# Patient Record
Sex: FEMALE | Race: WHITE | NOT HISPANIC OR LATINO | Employment: OTHER | URBAN - METROPOLITAN AREA
[De-identification: names, ages, dates, MRNs, and addresses within clinical notes are randomized per-mention and may not be internally consistent; named-entity substitution may affect disease eponyms.]

---

## 2017-03-08 ENCOUNTER — OFFICE VISIT (OUTPATIENT)
Dept: SURGERY | Facility: CLINIC | Age: 82
End: 2017-03-08
Payer: MEDICARE

## 2017-03-08 VITALS
WEIGHT: 157.19 LBS | HEART RATE: 98 BPM | TEMPERATURE: 98 F | DIASTOLIC BLOOD PRESSURE: 73 MMHG | SYSTOLIC BLOOD PRESSURE: 129 MMHG

## 2017-03-08 DIAGNOSIS — K64.0 FIRST DEGREE HEMORRHOIDS: Primary | ICD-10-CM

## 2017-03-08 PROCEDURE — 99999 PR PBB SHADOW E&M-NEW PATIENT-LVL III: CPT | Mod: PBBFAC,,, | Performed by: SURGERY

## 2017-03-08 PROCEDURE — 99203 OFFICE O/P NEW LOW 30 MIN: CPT | Mod: PBBFAC,PO | Performed by: SURGERY

## 2017-03-08 PROCEDURE — 99203 OFFICE O/P NEW LOW 30 MIN: CPT | Mod: S$PBB,,, | Performed by: SURGERY

## 2017-03-08 RX ORDER — ASPIRIN 81 MG/1
325 TABLET ORAL DAILY
COMMUNITY
End: 2021-03-01

## 2017-03-08 RX ORDER — LISINOPRIL 20 MG/1
20 TABLET ORAL DAILY
COMMUNITY
End: 2021-03-01

## 2017-03-08 RX ORDER — SIMVASTATIN 40 MG/1
TABLET, FILM COATED ORAL
Refills: 3 | COMMUNITY
Start: 2017-01-11 | End: 2021-03-01

## 2017-03-08 RX ORDER — FOLIC ACID 0.4 MG
400 TABLET ORAL DAILY
COMMUNITY

## 2017-03-08 RX ORDER — HYDROCORTISONE 2.5% 25 MG/G
CREAM TOPICAL
Refills: 1 | COMMUNITY
Start: 2017-02-22 | End: 2021-03-01

## 2017-03-08 RX ORDER — HUMAN INSULIN 100 [IU]/ML
INJECTION, SUSPENSION SUBCUTANEOUS
Refills: 3 | COMMUNITY
Start: 2017-01-11 | End: 2021-03-01

## 2017-03-08 RX ORDER — CHOLECALCIFEROL (VITAMIN D3) 25 MCG
185 TABLET ORAL DAILY
COMMUNITY
End: 2021-03-01

## 2017-03-08 RX ORDER — CYANOCOBALAMIN 1000 UG/ML
INJECTION, SOLUTION INTRAMUSCULAR; SUBCUTANEOUS
Refills: 3 | COMMUNITY
Start: 2016-12-14 | End: 2020-12-21

## 2017-03-08 RX ORDER — BUPROPION HYDROCHLORIDE 100 MG/1
TABLET, EXTENDED RELEASE ORAL
Refills: 3 | COMMUNITY
Start: 2017-02-03 | End: 2021-03-01

## 2017-03-08 RX ORDER — ALENDRONATE SODIUM 70 MG/1
TABLET ORAL
Refills: 11 | COMMUNITY
Start: 2017-01-11 | End: 2021-05-03

## 2017-03-08 RX ORDER — METFORMIN HYDROCHLORIDE 500 MG/1
TABLET, EXTENDED RELEASE ORAL
Refills: 1 | COMMUNITY
Start: 2016-12-14 | End: 2021-03-01

## 2017-03-08 NOTE — LETTER
March 8, 2017        Self Referral             [place] [facility] – [place] - General Surgery  [address]  [place] [address]  Phone: [phone]   Patient: Galilea Fountain   MR Number: [id number]   YOB: 1928   Date of Visit: 3/8/2017       Dear  Referral:    Thank you for referring Galilea Fountain to me for evaluation. Attached you will find relevant portions of my assessment and plan of care.    If you have questions, please do not hesitate to call me. I look forward to following Galilea Fountain along with you.    Sincerely,      Reagan Ariza MD            CC  Jace Baca MD    Enclosure

## 2017-03-08 NOTE — PROGRESS NOTES
Subjective:       Patient ID: Galilea Fountain is a 88 y.o. female.    Chief Complaint: Consult (hemorr)    HPI 88-year-old female referred by Dr. Li for evaluation of hemorrhoids.  The patient says she complained of some swelling and some slight anal discomfort most notably since a episode of straining from a constipated bowel movement about 3 weeks ago.  She has never had significant problems with hemorrhoids before.  She does not appear to have any rectal prolapse.  There's been no bleeding.  She is currently having normal bowel movements.  She denies any anal pain currently other than some mild pressure at times.  She is tolerating a regular diet.    Review of Systems   Constitutional: Negative for activity change, chills, fever and unexpected weight change.   HENT: Negative for congestion, hearing loss, sore throat and voice change.    Eyes: Negative.    Respiratory: Negative for cough, shortness of breath and wheezing.    Cardiovascular: Negative for chest pain and palpitations.   Gastrointestinal: Positive for constipation and rectal pain. Negative for abdominal pain, blood in stool, nausea and vomiting.   Genitourinary: Negative for dysuria, frequency and hematuria.   Musculoskeletal: Negative for arthralgias, back pain and neck pain.   Skin: Negative for color change and wound.   Allergic/Immunologic: Negative.    Neurological: Negative for dizziness, weakness and headaches.   Hematological: Negative for adenopathy. Does not bruise/bleed easily.   Psychiatric/Behavioral: Negative for confusion and dysphoric mood. The patient is not nervous/anxious.      Objective:     Physical Exam   Constitutional: She is oriented to person, place, and time. She appears well-developed and well-nourished. No distress.   HENT:   Head: Normocephalic and atraumatic.   Eyes: Conjunctivae and EOM are normal. Pupils are equal, round, and reactive to light. No scleral icterus.   Neck: Normal range of motion. Neck supple. No  thyromegaly present.   Cardiovascular: Normal rate, regular rhythm, normal heart sounds and intact distal pulses.    No murmur heard.  Pulmonary/Chest: No stridor. No respiratory distress. She has no wheezes. She has no rales.   Abdominal: Soft. Bowel sounds are normal. She exhibits no distension and no mass. There is no tenderness.   Genitourinary:   Genitourinary Comments: Patient has some mild external and internal hemorrhoids.  There is no bleeding.  There is no anal tenderness.  She has essentially normal tone.  There is no fissure.  There is no evidence of prolapse.   Musculoskeletal: Normal range of motion. She exhibits no edema.   Lymphadenopathy:     She has no cervical adenopathy.   Neurological: She is alert and oriented to person, place, and time. No cranial nerve deficit. She exhibits normal muscle tone.   Skin: Skin is warm and dry. No rash noted. No erythema.   Psychiatric: She has a normal mood and affect. Her behavior is normal. Judgment normal.     Assessment:     Encounter Diagnosis   Name Primary?    First degree hemorrhoids Yes       Plan:      1.  Discussed continued efforts to remain regular.  2.  I do not think she needs any surgical intervention for her hemorrhoids at this time.  3.  She will follow-up with me as needed.

## 2020-10-29 ENCOUNTER — OFFICE VISIT (OUTPATIENT)
Dept: FAMILY MEDICINE | Facility: CLINIC | Age: 85
End: 2020-10-29
Payer: MEDICARE

## 2020-10-29 VITALS
WEIGHT: 160 LBS | DIASTOLIC BLOOD PRESSURE: 70 MMHG | BODY MASS INDEX: 30.21 KG/M2 | SYSTOLIC BLOOD PRESSURE: 126 MMHG | HEART RATE: 80 BPM | HEIGHT: 61 IN | OXYGEN SATURATION: 96 % | TEMPERATURE: 98 F | RESPIRATION RATE: 16 BRPM

## 2020-10-29 DIAGNOSIS — E11.65 UNCONTROLLED TYPE 2 DIABETES MELLITUS WITH HYPERGLYCEMIA: Primary | ICD-10-CM

## 2020-10-29 PROCEDURE — 99213 PR OFFICE/OUTPT VISIT, EST, LEVL III, 20-29 MIN: ICD-10-PCS | Mod: S$GLB,,, | Performed by: INTERNAL MEDICINE

## 2020-10-29 PROCEDURE — 99213 OFFICE O/P EST LOW 20 MIN: CPT | Mod: S$GLB,,, | Performed by: INTERNAL MEDICINE

## 2020-10-29 PROCEDURE — 90694 FLU VACCINE - QUADRIVALENT - ADJUVANTED: ICD-10-PCS | Mod: S$GLB,,, | Performed by: INTERNAL MEDICINE

## 2020-10-29 PROCEDURE — 90694 VACC AIIV4 NO PRSRV 0.5ML IM: CPT | Mod: S$GLB,,, | Performed by: INTERNAL MEDICINE

## 2020-10-29 PROCEDURE — G0008 ADMIN INFLUENZA VIRUS VAC: HCPCS | Mod: S$GLB,,, | Performed by: INTERNAL MEDICINE

## 2020-10-29 PROCEDURE — G0008 FLU VACCINE - QUADRIVALENT - ADJUVANTED: ICD-10-PCS | Mod: S$GLB,,, | Performed by: INTERNAL MEDICINE

## 2020-10-29 RX ORDER — CLOPIDOGREL BISULFATE 75 MG/1
75 TABLET ORAL
COMMUNITY
Start: 2022-02-28 | End: 2022-09-29 | Stop reason: SDUPTHER

## 2020-10-29 RX ORDER — INSULIN DEGLUDEC 100 U/ML
16 INJECTION, SOLUTION SUBCUTANEOUS
COMMUNITY
End: 2021-03-01

## 2020-10-29 RX ORDER — ATORVASTATIN CALCIUM 80 MG/1
80 TABLET, FILM COATED ORAL
COMMUNITY
Start: 2022-02-28 | End: 2022-09-29 | Stop reason: SDUPTHER

## 2020-10-29 RX ORDER — INSULIN LISPRO 100 [IU]/ML
4 INJECTION, SOLUTION INTRAVENOUS; SUBCUTANEOUS
COMMUNITY

## 2020-10-29 RX ORDER — METOPROLOL TARTRATE 25 MG/1
25 TABLET, FILM COATED ORAL 2 TIMES DAILY
COMMUNITY
Start: 2022-02-28 | End: 2022-09-29 | Stop reason: SDUPTHER

## 2020-10-29 RX ORDER — ISOSORBIDE MONONITRATE 30 MG/1
30 TABLET, EXTENDED RELEASE ORAL
COMMUNITY
Start: 2020-09-28 | End: 2022-09-29 | Stop reason: SDUPTHER

## 2020-10-29 RX ORDER — DOCUSATE SODIUM 100 MG/1
100 CAPSULE, LIQUID FILLED ORAL
COMMUNITY

## 2020-10-29 NOTE — PATIENT INSTRUCTIONS
Understanding Carbohydrates, Fats, and Protein  Food is a source of fuel and nourishment for your body. Its also a source of pleasure. Having diabetes doesnt mean you have to eat special foods or give up desserts. Instead, your dietitian can show you how to plan meals to suit your body. To start, learn how different foods affect blood sugar.  Carbohydrates  Carbohydrates are the main source of fuel for the body. Carbohydrates raise blood sugar. Many people think carbohydrates are only found in pasta or bread. But carbohydrates are actually in many kinds of foods:  · Sugars occur naturally in foods such as fruit, milk, honey, and molasses. Sugars can also be added to many foods, from cereals and yogurt to candy and desserts. Sugars raise blood sugar.  · Starches are found in bread, cereals, pasta, and dried beans. Theyre also found in corn, peas, potatoes, yam, acorn squash, and butternut squash. Starches also raise blood sugar.   · Fiber is found in foods such as vegetables, fruits, beans, and whole grains. Unlike other carbs, fiber isnt digested or absorbed. So it doesnt raise blood sugar. In fact, fiber can help keep blood sugar from rising too fast. It also helps keep blood cholesterol at a healthy level.  Did you know?  Even though carbohydrates raise blood sugar, its best to have some in every meal. They are an important part of a healthy diet.   Fat  Fat is an energy source that can be stored until needed. Fat does not raise blood sugar. However, it can raise blood cholesterol, increasing the risk of heart disease. Fat is also high in calories, which can cause weight gain. Not all types of fat are the same.  More Healthy:  · Monounsaturated fats are mostly found in vegetable oils, such as olive, canola, and peanut oils. They are also found in avocados and some nuts. Monounsaturated fats are healthy for your heart. Thats because they lower LDL (unhealthy) cholesterol.  · Polyunsaturated fats are mostly  found in vegetable oils, such as corn, safflower, and soybean oils. They are also found in some seeds, nuts, and fish. Polyunsaturated fats lower LDL (unhealthy) cholesterol. So, choosing them instead of saturated fats is healthy for your heart. Certain unsaturated fats can help lower triglycerides.   Less Healthy:  · Saturated fats are found in animal products, such as meat, poultry, whole milk, lard, and butter. Saturated fats raise LDL cholesterol and are not healthy for your heart.  · Hydrogenated oils and trans fats are formed when vegetable oils are processed into solid fats. They are found in many processed foods. Hydrogenated oils and trans fats raise LDL cholesterol and lower HDL (healthy) cholesterol. They are not healthy for your heart.  Protein  Protein helps the body build and repair muscle and other tissue. Protein has little or no effect on blood sugar. However, many foods that contain protein also contain saturated fat. By choosing low-fat protein sources, you can get the benefits of protein without the extra fat:  · Plant protein is found in dry beans and peas, nuts, and soy products, such as tofu and soymilk. These sources tend to be cholesterol-free and low in saturated fat.  · Animal protein is found in fish, poultry, meat, cheese, milk, and eggs. These contain cholesterol and can be high in saturated fat. Aim for lean, lower-fat choices.  Date Last Reviewed: 3/1/2016  © 4904-2389 [employer]. [address] [place] [address], [place] [address]. All rights reserved. This information is not intended as a substitute for professional medical care. Always follow your healthcare professional's instructions.

## 2020-10-29 NOTE — PROGRESS NOTES
Subjective:       Patient ID: Galilea Fountain is a 89 y.o. female.    Chief Complaint: Follow-up (Lab work)    Pt here for routine DM follow up  Hgb A1c now 8.6 on 10/27/2020. By her accuchek record, her FBS are < 125 and 2 hrs after meal are < 170. These numbers do not correlate with the HgbA1c      Follow-up  This is a chronic problem.     Review of Systems      Objective:      Physical Exam    Assessment:       No diagnosis found.    Plan:

## 2020-10-29 NOTE — PROGRESS NOTES
Subjective:       Patient ID: Galilea Fountain is a 89 y.o. female.    Chief Complaint: Follow-up (Lab work)    HPI  Review of Systems      Objective:      Physical Exam    Assessment:       No diagnosis found.    Plan:

## 2021-02-24 ENCOUNTER — APPOINTMENT (OUTPATIENT)
Dept: LAB | Facility: HOSPITAL | Age: 86
End: 2021-02-24
Attending: INTERNAL MEDICINE
Payer: MEDICARE

## 2021-03-01 ENCOUNTER — OFFICE VISIT (OUTPATIENT)
Dept: FAMILY MEDICINE | Facility: CLINIC | Age: 86
End: 2021-03-01
Payer: MEDICARE

## 2021-03-01 VITALS
BODY MASS INDEX: 30.82 KG/M2 | SYSTOLIC BLOOD PRESSURE: 122 MMHG | HEART RATE: 66 BPM | HEIGHT: 60 IN | DIASTOLIC BLOOD PRESSURE: 62 MMHG | TEMPERATURE: 97 F | WEIGHT: 157 LBS | OXYGEN SATURATION: 96 % | RESPIRATION RATE: 16 BRPM

## 2021-03-01 DIAGNOSIS — D64.9 ANEMIA, UNSPECIFIED TYPE: ICD-10-CM

## 2021-03-01 DIAGNOSIS — E03.9 HYPOTHYROIDISM, UNSPECIFIED TYPE: ICD-10-CM

## 2021-03-01 DIAGNOSIS — E78.00 HYPERCHOLESTEROLEMIA: ICD-10-CM

## 2021-03-01 DIAGNOSIS — M81.0 AGE RELATED OSTEOPOROSIS, UNSPECIFIED PATHOLOGICAL FRACTURE PRESENCE: ICD-10-CM

## 2021-03-01 DIAGNOSIS — Z79.899 ENCOUNTER FOR LONG-TERM (CURRENT) USE OF OTHER MEDICATIONS: ICD-10-CM

## 2021-03-01 DIAGNOSIS — E53.8 B12 DEFICIENCY: ICD-10-CM

## 2021-03-01 DIAGNOSIS — E11.9 DIABETES MELLITUS WITHOUT COMPLICATION: Primary | ICD-10-CM

## 2021-03-01 DIAGNOSIS — G62.9 NEUROPATHY: ICD-10-CM

## 2021-03-01 PROCEDURE — 99213 OFFICE O/P EST LOW 20 MIN: CPT | Mod: S$GLB,,, | Performed by: INTERNAL MEDICINE

## 2021-03-01 PROCEDURE — 99213 PR OFFICE/OUTPT VISIT, EST, LEVL III, 20-29 MIN: ICD-10-PCS | Mod: S$GLB,,, | Performed by: INTERNAL MEDICINE

## 2021-03-01 RX ORDER — ERGOCALCIFEROL 1.25 MG/1
50000 CAPSULE ORAL
COMMUNITY

## 2021-03-01 RX ORDER — LANCETS
EACH MISCELLANEOUS
COMMUNITY
Start: 2020-12-31 | End: 2021-07-06

## 2021-03-01 RX ORDER — ASPIRIN 325 MG
325 TABLET ORAL DAILY
COMMUNITY

## 2021-05-04 ENCOUNTER — OFFICE VISIT (OUTPATIENT)
Dept: FAMILY MEDICINE | Facility: CLINIC | Age: 86
End: 2021-05-04
Payer: MEDICARE

## 2021-05-04 VITALS
DIASTOLIC BLOOD PRESSURE: 62 MMHG | OXYGEN SATURATION: 94 % | WEIGHT: 159 LBS | SYSTOLIC BLOOD PRESSURE: 112 MMHG | BODY MASS INDEX: 31.22 KG/M2 | HEART RATE: 69 BPM | HEIGHT: 60 IN | TEMPERATURE: 98 F | RESPIRATION RATE: 16 BRPM

## 2021-05-04 DIAGNOSIS — M81.0 AGE RELATED OSTEOPOROSIS, UNSPECIFIED PATHOLOGICAL FRACTURE PRESENCE: ICD-10-CM

## 2021-05-04 DIAGNOSIS — E78.00 HYPERCHOLESTEROLEMIA: ICD-10-CM

## 2021-05-04 DIAGNOSIS — G62.9 NEUROPATHY: ICD-10-CM

## 2021-05-04 DIAGNOSIS — I25.10 CORONARY ARTERY DISEASE INVOLVING NATIVE CORONARY ARTERY OF NATIVE HEART WITHOUT ANGINA PECTORIS: ICD-10-CM

## 2021-05-04 DIAGNOSIS — E53.8 B12 DEFICIENCY: ICD-10-CM

## 2021-05-04 DIAGNOSIS — G45.9 TIA (TRANSIENT ISCHEMIC ATTACK): Primary | ICD-10-CM

## 2021-05-04 DIAGNOSIS — E11.9 DIABETES MELLITUS WITHOUT COMPLICATION: ICD-10-CM

## 2021-05-04 DIAGNOSIS — I10 ESSENTIAL HYPERTENSION: ICD-10-CM

## 2021-05-04 DIAGNOSIS — Z98.61 HISTORY OF PTCA: ICD-10-CM

## 2021-05-04 PROCEDURE — 99213 PR OFFICE/OUTPT VISIT, EST, LEVL III, 20-29 MIN: ICD-10-PCS | Mod: S$GLB,,, | Performed by: INTERNAL MEDICINE

## 2021-05-04 PROCEDURE — 99213 OFFICE O/P EST LOW 20 MIN: CPT | Mod: S$GLB,,, | Performed by: INTERNAL MEDICINE

## 2021-06-01 ENCOUNTER — LAB VISIT (OUTPATIENT)
Dept: FAMILY MEDICINE | Facility: CLINIC | Age: 86
End: 2021-06-01
Payer: MEDICARE

## 2021-06-01 DIAGNOSIS — Z79.899 ENCOUNTER FOR LONG-TERM (CURRENT) USE OF OTHER MEDICATIONS: ICD-10-CM

## 2021-06-01 DIAGNOSIS — E53.8 B12 DEFICIENCY: ICD-10-CM

## 2021-06-01 DIAGNOSIS — E11.9 DIABETES MELLITUS WITHOUT COMPLICATION: ICD-10-CM

## 2021-06-01 DIAGNOSIS — E03.9 HYPOTHYROIDISM, UNSPECIFIED TYPE: ICD-10-CM

## 2021-06-01 DIAGNOSIS — E78.00 HYPERCHOLESTEROLEMIA: ICD-10-CM

## 2021-06-01 LAB
ALBUMIN SERPL BCP-MCNC: 3.4 G/DL (ref 3.5–5.2)
ALP SERPL-CCNC: 66 U/L (ref 55–135)
ALT SERPL W/O P-5'-P-CCNC: 13 U/L (ref 10–44)
ANION GAP SERPL CALC-SCNC: 10 MMOL/L (ref 8–16)
AST SERPL-CCNC: 14 U/L (ref 10–40)
BASOPHILS # BLD AUTO: 0.04 K/UL (ref 0–0.2)
BASOPHILS NFR BLD: 0.7 % (ref 0–1.9)
BILIRUB SERPL-MCNC: 0.4 MG/DL (ref 0.1–1)
BUN SERPL-MCNC: 31 MG/DL (ref 10–30)
CALCIUM SERPL-MCNC: 8.6 MG/DL (ref 8.7–10.5)
CHLORIDE SERPL-SCNC: 111 MMOL/L (ref 95–110)
CHOLEST SERPL-MCNC: 119 MG/DL (ref 120–199)
CHOLEST/HDLC SERPL: 2.9 {RATIO} (ref 2–5)
CO2 SERPL-SCNC: 18 MMOL/L (ref 23–29)
CREAT SERPL-MCNC: 1.6 MG/DL (ref 0.5–1.4)
DIFFERENTIAL METHOD: ABNORMAL
EOSINOPHIL # BLD AUTO: 0.1 K/UL (ref 0–0.5)
EOSINOPHIL NFR BLD: 1.9 % (ref 0–8)
ERYTHROCYTE [DISTWIDTH] IN BLOOD BY AUTOMATED COUNT: 13.6 % (ref 11.5–14.5)
EST. GFR  (AFRICAN AMERICAN): 32 ML/MIN/1.73 M^2
EST. GFR  (NON AFRICAN AMERICAN): 28 ML/MIN/1.73 M^2
GLUCOSE SERPL-MCNC: 306 MG/DL (ref 70–110)
HCT VFR BLD AUTO: 38.2 % (ref 37–48.5)
HDLC SERPL-MCNC: 41 MG/DL (ref 40–75)
HDLC SERPL: 34.5 % (ref 20–50)
HGB BLD-MCNC: 11.5 G/DL (ref 12–16)
IMM GRANULOCYTES # BLD AUTO: 0.02 K/UL (ref 0–0.04)
IMM GRANULOCYTES NFR BLD AUTO: 0.3 % (ref 0–0.5)
LDLC SERPL CALC-MCNC: 49.8 MG/DL (ref 63–159)
LYMPHOCYTES # BLD AUTO: 1.3 K/UL (ref 1–4.8)
LYMPHOCYTES NFR BLD: 21.7 % (ref 18–48)
MCH RBC QN AUTO: 30.7 PG (ref 27–31)
MCHC RBC AUTO-ENTMCNC: 30.1 G/DL (ref 32–36)
MCV RBC AUTO: 102 FL (ref 82–98)
MONOCYTES # BLD AUTO: 0.5 K/UL (ref 0.3–1)
MONOCYTES NFR BLD: 9.3 % (ref 4–15)
NEUTROPHILS # BLD AUTO: 3.8 K/UL (ref 1.8–7.7)
NEUTROPHILS NFR BLD: 66.1 % (ref 38–73)
NONHDLC SERPL-MCNC: 78 MG/DL
NRBC BLD-RTO: 0 /100 WBC
PLATELET # BLD AUTO: 194 K/UL (ref 150–450)
PMV BLD AUTO: 11.1 FL (ref 9.2–12.9)
POTASSIUM SERPL-SCNC: 4.7 MMOL/L (ref 3.5–5.1)
PROT SERPL-MCNC: 6.3 G/DL (ref 6–8.4)
RBC # BLD AUTO: 3.74 M/UL (ref 4–5.4)
SODIUM SERPL-SCNC: 139 MMOL/L (ref 136–145)
T4 FREE SERPL-MCNC: 0.77 NG/DL (ref 0.71–1.51)
TRIGL SERPL-MCNC: 141 MG/DL (ref 30–150)
TSH SERPL DL<=0.005 MIU/L-ACNC: 1.88 UIU/ML (ref 0.4–4)
WBC # BLD AUTO: 5.8 K/UL (ref 3.9–12.7)

## 2021-06-01 PROCEDURE — 80061 LIPID PANEL: CPT | Performed by: INTERNAL MEDICINE

## 2021-06-01 PROCEDURE — 85025 COMPLETE CBC W/AUTO DIFF WBC: CPT | Performed by: INTERNAL MEDICINE

## 2021-06-01 PROCEDURE — 80053 COMPREHEN METABOLIC PANEL: CPT | Performed by: INTERNAL MEDICINE

## 2021-06-01 PROCEDURE — 83036 HEMOGLOBIN GLYCOSYLATED A1C: CPT | Performed by: INTERNAL MEDICINE

## 2021-06-01 PROCEDURE — 84443 ASSAY THYROID STIM HORMONE: CPT | Performed by: INTERNAL MEDICINE

## 2021-06-01 PROCEDURE — 84439 ASSAY OF FREE THYROXINE: CPT | Performed by: INTERNAL MEDICINE

## 2021-06-02 LAB
ESTIMATED AVG GLUCOSE: 206 MG/DL (ref 68–131)
HBA1C MFR BLD: 8.8 % (ref 4–5.6)

## 2021-06-03 ENCOUNTER — OFFICE VISIT (OUTPATIENT)
Dept: FAMILY MEDICINE | Facility: CLINIC | Age: 86
End: 2021-06-03
Payer: MEDICARE

## 2021-06-03 VITALS
BODY MASS INDEX: 31.22 KG/M2 | TEMPERATURE: 98 F | HEIGHT: 60 IN | HEART RATE: 81 BPM | SYSTOLIC BLOOD PRESSURE: 136 MMHG | DIASTOLIC BLOOD PRESSURE: 78 MMHG | RESPIRATION RATE: 16 BRPM | OXYGEN SATURATION: 96 % | WEIGHT: 159 LBS

## 2021-06-03 DIAGNOSIS — Z98.61 HISTORY OF PTCA: ICD-10-CM

## 2021-06-03 DIAGNOSIS — B35.1 ONYCHOMYCOSIS OF MULTIPLE TOENAILS WITH TYPE 2 DIABETES MELLITUS: ICD-10-CM

## 2021-06-03 DIAGNOSIS — E78.00 HYPERCHOLESTEROLEMIA: ICD-10-CM

## 2021-06-03 DIAGNOSIS — E11.69 ONYCHOMYCOSIS OF MULTIPLE TOENAILS WITH TYPE 2 DIABETES MELLITUS: ICD-10-CM

## 2021-06-03 DIAGNOSIS — I10 ESSENTIAL HYPERTENSION: ICD-10-CM

## 2021-06-03 DIAGNOSIS — G45.9 TIA (TRANSIENT ISCHEMIC ATTACK): ICD-10-CM

## 2021-06-03 DIAGNOSIS — M85.852 OSTEOPENIA OF LEFT HIP: ICD-10-CM

## 2021-06-03 DIAGNOSIS — G62.9 NEUROPATHY: ICD-10-CM

## 2021-06-03 DIAGNOSIS — E11.9 DIABETES MELLITUS WITHOUT COMPLICATION: Primary | ICD-10-CM

## 2021-06-03 PROCEDURE — 99397 PR PREVENTIVE VISIT,EST,65 & OVER: ICD-10-PCS | Mod: S$GLB,,, | Performed by: INTERNAL MEDICINE

## 2021-06-03 PROCEDURE — 99397 PER PM REEVAL EST PAT 65+ YR: CPT | Mod: S$GLB,,, | Performed by: INTERNAL MEDICINE

## 2021-09-21 ENCOUNTER — LAB VISIT (OUTPATIENT)
Dept: FAMILY MEDICINE | Facility: CLINIC | Age: 86
End: 2021-09-21
Payer: MEDICARE

## 2021-09-21 DIAGNOSIS — E11.9 DIABETES MELLITUS WITHOUT COMPLICATION: ICD-10-CM

## 2021-09-21 LAB
ESTIMATED AVG GLUCOSE: 177 MG/DL (ref 68–131)
HBA1C MFR BLD: 7.8 % (ref 4–5.6)

## 2021-09-21 PROCEDURE — 83036 HEMOGLOBIN GLYCOSYLATED A1C: CPT | Performed by: INTERNAL MEDICINE

## 2021-09-21 PROCEDURE — 82570 ASSAY OF URINE CREATININE: CPT | Performed by: INTERNAL MEDICINE

## 2021-09-22 LAB
ALBUMIN/CREAT UR: 26.7 UG/MG (ref 0–30)
CREAT UR-MCNC: 150 MG/DL (ref 15–325)
MICROALBUMIN UR DL<=1MG/L-MCNC: 40 UG/ML

## 2021-09-23 ENCOUNTER — OFFICE VISIT (OUTPATIENT)
Dept: FAMILY MEDICINE | Facility: CLINIC | Age: 86
End: 2021-09-23
Payer: MEDICARE

## 2021-09-23 VITALS
DIASTOLIC BLOOD PRESSURE: 68 MMHG | SYSTOLIC BLOOD PRESSURE: 132 MMHG | BODY MASS INDEX: 32.2 KG/M2 | HEART RATE: 70 BPM | RESPIRATION RATE: 16 BRPM | OXYGEN SATURATION: 95 % | TEMPERATURE: 98 F | WEIGHT: 164 LBS | HEIGHT: 60 IN

## 2021-09-23 DIAGNOSIS — G62.9 NEUROPATHY: ICD-10-CM

## 2021-09-23 DIAGNOSIS — M85.852 OSTEOPENIA OF LEFT HIP: ICD-10-CM

## 2021-09-23 DIAGNOSIS — E11.9 DIABETES MELLITUS WITHOUT COMPLICATION: Primary | ICD-10-CM

## 2021-09-23 DIAGNOSIS — G45.9 TIA (TRANSIENT ISCHEMIC ATTACK): ICD-10-CM

## 2021-09-23 PROCEDURE — 99212 OFFICE O/P EST SF 10 MIN: CPT | Mod: S$GLB,,, | Performed by: INTERNAL MEDICINE

## 2021-09-23 PROCEDURE — 99212 PR OFFICE/OUTPT VISIT, EST, LEVL II, 10-19 MIN: ICD-10-PCS | Mod: S$GLB,,, | Performed by: INTERNAL MEDICINE

## 2021-10-11 ENCOUNTER — CLINICAL SUPPORT (OUTPATIENT)
Dept: FAMILY MEDICINE | Facility: CLINIC | Age: 86
End: 2021-10-11
Payer: MEDICARE

## 2021-10-11 ENCOUNTER — TELEPHONE (OUTPATIENT)
Dept: FAMILY MEDICINE | Facility: CLINIC | Age: 86
End: 2021-10-11

## 2021-10-11 PROCEDURE — G0008 FLU VACCINE - QUADRIVALENT - ADJUVANTED: ICD-10-PCS | Mod: S$GLB,,, | Performed by: INTERNAL MEDICINE

## 2021-10-11 PROCEDURE — 90694 FLU VACCINE - QUADRIVALENT - ADJUVANTED: ICD-10-PCS | Mod: S$GLB,,, | Performed by: INTERNAL MEDICINE

## 2021-10-11 PROCEDURE — G0008 ADMIN INFLUENZA VIRUS VAC: HCPCS | Mod: S$GLB,,, | Performed by: INTERNAL MEDICINE

## 2021-10-11 PROCEDURE — 90694 VACC AIIV4 NO PRSRV 0.5ML IM: CPT | Mod: S$GLB,,, | Performed by: INTERNAL MEDICINE

## 2021-10-14 DIAGNOSIS — E11.9 DIABETES MELLITUS WITHOUT COMPLICATION: Primary | ICD-10-CM

## 2021-10-14 RX ORDER — INSULIN ASPART 100 [IU]/ML
8 INJECTION, SOLUTION INTRAVENOUS; SUBCUTANEOUS
Qty: 15 ML | Refills: 3 | Status: SHIPPED | OUTPATIENT
Start: 2021-10-14 | End: 2022-11-10

## 2021-11-16 ENCOUNTER — LAB VISIT (OUTPATIENT)
Dept: FAMILY MEDICINE | Facility: CLINIC | Age: 86
End: 2021-11-16
Payer: MEDICARE

## 2021-11-16 DIAGNOSIS — Z79.899 ENCOUNTER FOR LONG-TERM (CURRENT) USE OF OTHER MEDICATIONS: ICD-10-CM

## 2021-11-16 DIAGNOSIS — M81.0 SENILE OSTEOPOROSIS: ICD-10-CM

## 2021-11-16 DIAGNOSIS — Z79.83 PERSONAL HISTORY OF ONGOING TREATMENT WITH ALENDRONATE (FOSAMAX): Primary | ICD-10-CM

## 2021-11-18 LAB — COLLAGEN CTX SERPL-MCNC: 264 PG/ML

## 2022-02-02 ENCOUNTER — CLINICAL SUPPORT (OUTPATIENT)
Dept: FAMILY MEDICINE | Facility: CLINIC | Age: 87
End: 2022-02-02
Payer: MEDICARE

## 2022-02-02 ENCOUNTER — TELEPHONE (OUTPATIENT)
Dept: FAMILY MEDICINE | Facility: CLINIC | Age: 87
End: 2022-02-02
Payer: MEDICARE

## 2022-02-02 ENCOUNTER — TELEPHONE (OUTPATIENT)
Dept: FAMILY MEDICINE | Facility: CLINIC | Age: 87
End: 2022-02-02

## 2022-02-02 DIAGNOSIS — Z11.52 ENCOUNTER FOR SCREENING FOR COVID-19: Primary | ICD-10-CM

## 2022-02-02 DIAGNOSIS — U07.1 COVID-19 VIRUS DETECTED: ICD-10-CM

## 2022-02-02 LAB
CTP QC/QA: YES
SARS-COV-2 RDRP RESP QL NAA+PROBE: POSITIVE

## 2022-02-02 PROCEDURE — U0002 COVID-19 LAB TEST NON-CDC: HCPCS | Mod: PBBFAC,PN

## 2022-02-02 NOTE — TELEPHONE ENCOUNTER
Called patient to report per Dr. Li that she is Covid positive. Patient verbalized understanding of results. Patient denies fever, shortness of breath or difficulty breathing. Patient reports that she has had a mild cough with sore throat but has been treating it with OTC medications. Instructed to call with any questions or concerns. MEGHNA Rick

## 2022-02-02 NOTE — TELEPHONE ENCOUNTER
Spoke with pt. Gave her COVID results.   This has been fully explained to the patient, who indicates understanding.

## 2022-02-28 ENCOUNTER — OFFICE VISIT (OUTPATIENT)
Dept: FAMILY MEDICINE | Facility: CLINIC | Age: 87
End: 2022-02-28
Payer: MEDICARE

## 2022-02-28 VITALS
WEIGHT: 164 LBS | OXYGEN SATURATION: 92 % | SYSTOLIC BLOOD PRESSURE: 116 MMHG | DIASTOLIC BLOOD PRESSURE: 76 MMHG | TEMPERATURE: 98 F | BODY MASS INDEX: 32.2 KG/M2 | HEIGHT: 60 IN | HEART RATE: 91 BPM

## 2022-02-28 DIAGNOSIS — E11.9 DIABETES MELLITUS WITHOUT COMPLICATION: Primary | ICD-10-CM

## 2022-02-28 DIAGNOSIS — E11.69 ONYCHOMYCOSIS OF MULTIPLE TOENAILS WITH TYPE 2 DIABETES MELLITUS: ICD-10-CM

## 2022-02-28 DIAGNOSIS — B35.1 ONYCHOMYCOSIS OF MULTIPLE TOENAILS WITH TYPE 2 DIABETES MELLITUS: ICD-10-CM

## 2022-02-28 DIAGNOSIS — U07.1 COVID-19: ICD-10-CM

## 2022-02-28 DIAGNOSIS — I47.20 VENTRICULAR TACHYCARDIA: ICD-10-CM

## 2022-02-28 DIAGNOSIS — R06.89 ALVEOLAR HYPOVENTILATION: ICD-10-CM

## 2022-02-28 PROCEDURE — 99213 OFFICE O/P EST LOW 20 MIN: CPT | Mod: CR,S$GLB,, | Performed by: INTERNAL MEDICINE

## 2022-02-28 PROCEDURE — 99213 PR OFFICE/OUTPT VISIT, EST, LEVL III, 20-29 MIN: ICD-10-PCS | Mod: CR,S$GLB,, | Performed by: INTERNAL MEDICINE

## 2022-02-28 RX ORDER — GLUCOSAM/CHON-MSM1/C/MANG/BOSW 500-416.6
TABLET ORAL
Qty: 100 EACH | Refills: 3 | Status: SHIPPED | OUTPATIENT
Start: 2022-02-28

## 2022-02-28 RX ORDER — CALCIUM CITRATE/VITAMIN D3 200MG-6.25
TABLET ORAL
Qty: 200 STRIP | Refills: 2 | Status: SHIPPED | OUTPATIENT
Start: 2022-02-28 | End: 2022-06-24

## 2022-02-28 NOTE — PROGRESS NOTES
Subjective:       Patient ID: Galilea Fountain is a 89 y.o. female.    Chief Complaint: Follow-up (Lab work was drawn previously here in office. A1C was done in hospital. ), Diabetes (Patient's daughter has a record of all glucose levels. Her recordings are for the entire month of February. Week of 2/11 ranges were 119-480. Flucuation happened week of 2/19 peaking several decrease in levels And began to drop and rise over the next 2 weeks again. ), and Nutrition Counseling (Typical daily diet includes Maple flavored oatmeal with raisins. Sprinkle of sugar in oatmeal and coffee (1 cup) and morning meds are administered. Boost shake at 10am. 12pm, Turkey sandwich on wheat bread. PBJ occasionally, small pieces. Glass of iced tea. Afternoon, jello or another boost shake. Fruit smoothie with greek yogurt. No extra sugar. Dinner: Cup of soup and 3 small crackers with butter. Water for drink. Before bed, Yogurt with fruit stirred in. Cesar cracker, 2 squares with PB in between. )    Patient is here in the presence of her daughter who is her primary caregiver to discuss her continue management of diabetes mellitus.  Her Accu-Cheks have been erratic lately most probably related to the fact that she received steroid therapy for management of her COVID 19. At this point being that is safer for her to have a slightly elevated glucose instead of hypoglycemia and being that on February 9, 2022 her hemoglobin A1c returned at 8.5 plans are for this to be repeated May 10, 2022 with a follow-up appointment.  They will continue doing Accu-Cheks at home fasting and 2 hours before the largest meal of the day on our goals are to have a fasting glucose equal to or less than 135 on a 2 hour postprandial less than 170.    At this point medication list has been reviewed and there is no need for refills.  For now she is to continue testing the capillary blood glucose is only twice a day on a new supply for testing strips will be ordered.  This  will be sent [facility].    Review of Systems   All other systems reviewed and are negative.        Objective:      Physical Exam  Vitals and nursing note reviewed. Exam conducted with a chaperone present.   Constitutional:       General: She is not in acute distress.     Appearance: Normal appearance. She is obese. She is not ill-appearing, toxic-appearing or diaphoretic.   HENT:      Head: Normocephalic.   Cardiovascular:      Rate and Rhythm: Normal rate and regular rhythm.   Skin:     General: Skin is warm and dry.      Coloration: Skin is not jaundiced or pale.   Neurological:      Mental Status: She is alert. Mental status is at baseline.      Sensory: Sensory deficit present.      Motor: Weakness present.      Gait: Gait abnormal.   Psychiatric:         Mood and Affect: Mood normal.         Assessment:       Problem List Items Addressed This Visit        Derm    Onychomycosis of multiple toenails with type 2 diabetes mellitus       Cardiac/Vascular    Ventricular tachycardia      Other Visit Diagnoses     Diabetes mellitus without complication    -  Primary    Relevant Medications    TRUE METRIX GLUCOSE TEST STRIP Strp    TRUEPLUS LANCETS 30 gauge Misc    Other Relevant Orders    Hemoglobin A1C    Alveolar hypoventilation        COVID-19              Plan:       With regards to her diabetes being the patient is 89 years of age and is on insulin therapy we will avoid any significant changes on the therapy that could increase the risk of hypoglycemia.  A hemoglobin A1c in the 7.5-8 range will be appropriate for her.  Instructions regarding same had already been provided to the patient.    She continues using the oxygen at home while asleep but during the daytime the using his intermittently on repeat pulse oximetry here now is back up to 96% room air after she had already been sitting for at least 10 minutes.  Patient is definitely not in respiratory distress.    Medication list was reviewed on about the  only thing to be sent today is going to be the supplies for the capillary blood glucose measurements.    There does not appear to be any sequela to the COVID-19 that she suffered back in January.  Patient is just still experiencing some dysgeusia.     Hemoglobin A1c has been scheduled for May 10, 2022 with a follow-up appointment.  Otherwise she has an excellent support system at home and they are well aware of the signs and symptoms to watch for and they are to seek medical attention in case these appear

## 2022-03-07 RX ORDER — CYANOCOBALAMIN 1000 UG/ML
INJECTION, SOLUTION INTRAMUSCULAR; SUBCUTANEOUS
Qty: 3 ML | Refills: 2 | OUTPATIENT
Start: 2022-03-07

## 2022-03-07 NOTE — TELEPHONE ENCOUNTER
Called in cyanocobalamin 1000mcg/ml to inject under the skin 1mL once per month #3 mL with two refills per Dr. Li. MEGHNA Rick

## 2022-03-20 ENCOUNTER — DOCUMENT SCAN (OUTPATIENT)
Dept: HOME HEALTH SERVICES | Facility: HOSPITAL | Age: 87
End: 2022-03-20
Payer: MEDICARE

## 2022-05-02 ENCOUNTER — TELEPHONE (OUTPATIENT)
Dept: FAMILY MEDICINE | Facility: CLINIC | Age: 87
End: 2022-05-02
Payer: MEDICARE

## 2022-05-10 ENCOUNTER — LAB VISIT (OUTPATIENT)
Dept: FAMILY MEDICINE | Facility: CLINIC | Age: 87
End: 2022-05-10
Payer: MEDICARE

## 2022-05-10 DIAGNOSIS — E11.9 DIABETES MELLITUS WITHOUT COMPLICATION: ICD-10-CM

## 2022-05-10 PROCEDURE — 83036 HEMOGLOBIN GLYCOSYLATED A1C: CPT | Performed by: INTERNAL MEDICINE

## 2022-05-11 LAB
ESTIMATED AVG GLUCOSE: 174 MG/DL (ref 68–131)
HBA1C MFR BLD: 7.7 % (ref 4–5.6)

## 2022-05-12 ENCOUNTER — OFFICE VISIT (OUTPATIENT)
Dept: FAMILY MEDICINE | Facility: CLINIC | Age: 87
End: 2022-05-12
Payer: MEDICARE

## 2022-05-12 VITALS
HEIGHT: 60 IN | DIASTOLIC BLOOD PRESSURE: 74 MMHG | TEMPERATURE: 98 F | WEIGHT: 163 LBS | HEART RATE: 73 BPM | SYSTOLIC BLOOD PRESSURE: 120 MMHG | OXYGEN SATURATION: 95 % | BODY MASS INDEX: 32 KG/M2

## 2022-05-12 DIAGNOSIS — Z98.61 HISTORY OF PTCA: ICD-10-CM

## 2022-05-12 DIAGNOSIS — E78.00 HYPERCHOLESTEROLEMIA: ICD-10-CM

## 2022-05-12 DIAGNOSIS — E11.9 DIABETES MELLITUS WITHOUT COMPLICATION: Primary | ICD-10-CM

## 2022-05-12 DIAGNOSIS — I10 ESSENTIAL HYPERTENSION: ICD-10-CM

## 2022-05-12 PROCEDURE — 99213 PR OFFICE/OUTPT VISIT, EST, LEVL III, 20-29 MIN: ICD-10-PCS | Mod: S$GLB,,, | Performed by: INTERNAL MEDICINE

## 2022-05-12 PROCEDURE — 99213 OFFICE O/P EST LOW 20 MIN: CPT | Mod: S$GLB,,, | Performed by: INTERNAL MEDICINE

## 2022-05-12 NOTE — PROGRESS NOTES
Subjective:       Patient ID: Galilea Fountain is a 89 y.o. female.    Chief Complaint: Follow-up (Patient is here to review results of Hemoglobin A1C that was drawn on 5/10/2022. ) and Care Gaps (Lipid Panel is due in June as well as Eye Exam; Foot Exam in August as well as Urine Screening.l )    Patient returns for follow-up of laboratory evaluation carried out at this facility on May 10, 2022. Her hemoglobin A1c was 7.7 giving her an average glucose calculated for the prior 60 days of only 174.  This is excellent diabetic control for 89-year-old who had 8.9 back on February of 2021 and the 1 after that he was 8.8 and now for the last 6+ months she has been staying equal to or less than 8 which is the goal of therapy.  Plans are for her to have a hemoglobin A1c repeated September 27, 2022 with a follow-up appointment.    Medication list has been reviewed and there is no need for refills at this time.    At the time of the next appointment patient will be due her yearly foot examination.    With regards to COVID vaccination she has had 2 doses and no booster as of now.  Shingles vaccination will be deleted being that there is no good reason to have it at 89 years of age.    The patient does have the equipment for performing capillary glucose testing at home and has enough of the testing equipment life.        Review of Systems   All other systems reviewed and are negative.        Objective:      Physical Exam  Vitals and nursing note reviewed. Exam conducted with a chaperone present.   Constitutional:       General: She is not in acute distress.     Appearance: Normal appearance. She is obese. She is not ill-appearing, toxic-appearing or diaphoretic.   HENT:      Head: Normocephalic and atraumatic.   Cardiovascular:      Rate and Rhythm: Normal rate and regular rhythm.   Skin:     General: Skin is warm and dry.      Capillary Refill: Capillary refill takes 2 to 3 seconds.      Coloration: Skin is not jaundiced or  pale.   Neurological:      General: No focal deficit present.      Mental Status: She is alert and oriented to person, place, and time. Mental status is at baseline.      Sensory: Sensory deficit present.      Comments: Patient has a history of sensorineural hearing loss but she is wearing hearing aids today which have made great difference   Psychiatric:         Mood and Affect: Mood normal.         Behavior: Behavior normal.         Thought Content: Thought content normal.         Assessment:       Problem List Items Addressed This Visit    None     Visit Diagnoses     Diabetes mellitus without complication    -  Primary    History of PTCA        Hypercholesterolemia        Essential hypertension              Plan:       Patient's diabetes is well controlled now with a hemoglobin A1c of 7.7 or equal to or less than 7.9 for the last to test so plans are for her to have a repeat lab work on September 27, 2022 with a follow-up appointment.    Shingles vaccine is to be deleted.    She has had 2 COVID vaccines as of now no booster.    Foot examination is not due until mid June so he will be carried out at the time of the next appointment.    Blood pressure is well controlled with current therapy.    She has enough testing supplies at home.    There have been no signs of TIA or any other 0 vascular type symptomatology.    Patient has an excellent support system at home they are very well aware of the signs and symptoms to watch for and to seek medical attention in case these appear

## 2022-05-16 ENCOUNTER — TELEPHONE (OUTPATIENT)
Dept: FAMILY MEDICINE | Facility: CLINIC | Age: 87
End: 2022-05-16
Payer: MEDICARE

## 2022-05-16 NOTE — TELEPHONE ENCOUNTER
Called and spoke to patient's daughter per request to cancel home oxygen. Per daughter, Ginny, patient was prescribed home oxygen on discharge but has not needed to use it in over two months. Per Dr. Li oxygen can be discontinued. Pharmacy to be notified to discontinue home oxygen. Daughter verbalized understanding. Instructed to call with any questions or concerns. MEGHNA Rick

## 2022-05-16 NOTE — TELEPHONE ENCOUNTER
----- Message from Shona  sent at 5/16/2022 11:33 AM CDT -----  Regarding: advice  Type: Needs Medical Advice    Who Called:      Best Call Back Number: [phone]  Additional Information: Requesting a call back from Nurse, regarding pt is trying to turn in OX tanks but pharmacy states they can not accept them until pt is released by  ,please advise and call back with advice

## 2022-08-17 DIAGNOSIS — E11.69 ONYCHOMYCOSIS OF MULTIPLE TOENAILS WITH TYPE 2 DIABETES MELLITUS: ICD-10-CM

## 2022-08-17 DIAGNOSIS — B35.1 ONYCHOMYCOSIS OF MULTIPLE TOENAILS WITH TYPE 2 DIABETES MELLITUS: ICD-10-CM

## 2022-09-20 ENCOUNTER — TELEPHONE (OUTPATIENT)
Dept: FAMILY MEDICINE | Facility: CLINIC | Age: 87
End: 2022-09-20
Payer: MEDICARE

## 2022-09-26 ENCOUNTER — LAB VISIT (OUTPATIENT)
Dept: FAMILY MEDICINE | Facility: CLINIC | Age: 87
End: 2022-09-26
Payer: MEDICARE

## 2022-09-26 DIAGNOSIS — E11.9 DIABETES MELLITUS WITHOUT COMPLICATION: ICD-10-CM

## 2022-09-26 PROCEDURE — 83036 HEMOGLOBIN GLYCOSYLATED A1C: CPT | Performed by: INTERNAL MEDICINE

## 2022-09-27 LAB
ESTIMATED AVG GLUCOSE: 192 MG/DL (ref 68–131)
HBA1C MFR BLD: 8.3 % (ref 4–5.6)

## 2022-09-29 ENCOUNTER — OFFICE VISIT (OUTPATIENT)
Dept: FAMILY MEDICINE | Facility: CLINIC | Age: 87
End: 2022-09-29
Payer: MEDICARE

## 2022-09-29 VITALS
HEIGHT: 60 IN | BODY MASS INDEX: 32.65 KG/M2 | WEIGHT: 166.31 LBS | OXYGEN SATURATION: 93 % | DIASTOLIC BLOOD PRESSURE: 70 MMHG | RESPIRATION RATE: 18 BRPM | SYSTOLIC BLOOD PRESSURE: 124 MMHG | TEMPERATURE: 98 F | HEART RATE: 80 BPM

## 2022-09-29 DIAGNOSIS — E78.00 HYPERCHOLESTEROLEMIA: ICD-10-CM

## 2022-09-29 DIAGNOSIS — R54 FRAILTY SYNDROME IN GERIATRIC PATIENT: ICD-10-CM

## 2022-09-29 DIAGNOSIS — E11.9 DIABETES MELLITUS WITHOUT COMPLICATION: ICD-10-CM

## 2022-09-29 DIAGNOSIS — Z98.61 HISTORY OF PTCA: Primary | ICD-10-CM

## 2022-09-29 DIAGNOSIS — G45.9 TIA (TRANSIENT ISCHEMIC ATTACK): ICD-10-CM

## 2022-09-29 DIAGNOSIS — M81.0 SENILE OSTEOPOROSIS: ICD-10-CM

## 2022-09-29 DIAGNOSIS — H90.3 SENSORINEURAL HEARING LOSS (SNHL) OF BOTH EARS: ICD-10-CM

## 2022-09-29 DIAGNOSIS — I10 ESSENTIAL HYPERTENSION: ICD-10-CM

## 2022-09-29 PROCEDURE — 99213 PR OFFICE/OUTPT VISIT, EST, LEVL III, 20-29 MIN: ICD-10-PCS | Mod: S$GLB,,, | Performed by: INTERNAL MEDICINE

## 2022-09-29 PROCEDURE — 99213 OFFICE O/P EST LOW 20 MIN: CPT | Mod: S$GLB,,, | Performed by: INTERNAL MEDICINE

## 2022-09-29 RX ORDER — CALCIUM CITRATE/VITAMIN D3 200MG-6.25
TABLET ORAL
Qty: 100 STRIP | Refills: 3 | Status: SHIPPED | OUTPATIENT
Start: 2022-09-29

## 2022-09-29 RX ORDER — ALENDRONATE SODIUM 70 MG/1
70 TABLET ORAL
Qty: 12 TABLET | Refills: 2 | Status: SHIPPED | OUTPATIENT
Start: 2022-09-29

## 2022-09-29 RX ORDER — ATORVASTATIN CALCIUM 80 MG/1
80 TABLET, FILM COATED ORAL NIGHTLY
Qty: 90 TABLET | Refills: 3 | Status: SHIPPED | OUTPATIENT
Start: 2022-09-29

## 2022-09-29 RX ORDER — CLOPIDOGREL BISULFATE 75 MG/1
75 TABLET ORAL DAILY
Qty: 90 TABLET | Refills: 3 | Status: SHIPPED | OUTPATIENT
Start: 2022-09-29

## 2022-09-29 RX ORDER — ISOSORBIDE MONONITRATE 30 MG/1
30 TABLET, EXTENDED RELEASE ORAL DAILY
Qty: 90 TABLET | Refills: 3 | Status: SHIPPED | OUTPATIENT
Start: 2022-09-29

## 2022-09-29 RX ORDER — METOPROLOL TARTRATE 25 MG/1
25 TABLET, FILM COATED ORAL 2 TIMES DAILY
Qty: 180 TABLET | Refills: 3 | Status: SHIPPED | OUTPATIENT
Start: 2022-09-29

## 2022-09-29 NOTE — PROGRESS NOTES
"Subjective:       Patient ID: Galilea Fountain is a 89 y.o. female.    Chief Complaint: No chief complaint on file.    Review of system is otherwise noncontributory.      There have been no falls.      Blood pressure is appropriate 124/70.      There are no signs of bleed on aspirin and clopidogrel.    She has enough testing supplies at home.    Patient presents for follow-up of her glycemic control with her daughter who is her primary caregiver.  Hemoglobin A1c was performed this facility on September 26, 2022 and returned at 8.3.  For 89-year-old patient this is not necessarily to high but for the sake of comparison back on May 10, 2022 he was 7.7 on September 21, 2021 was 7.8.  She definitely can do better if she stays on the same dose of insulins but controls her caloric intake better.  Activity here is not an option because of patient's frailty.    Medication list has been reviewed on refills will be issued as requested.      Regarding the care gaps the seasonal influenza vaccination is to be administered next week once we get our stock refill.  They are to call the office regarding same.      They can self referred for the eye examination.      Patient did not come in today  "prepared to be able to provide a urine sample for urine for microalbumin which is due.  At her age this is academic.  Review of Systems   All other systems reviewed and are negative.      Objective:      Physical Exam  Vitals and nursing note reviewed. Exam conducted with a chaperone present.   Constitutional:       General: She is not in acute distress.     Appearance: Normal appearance. She is obese. She is not ill-appearing, toxic-appearing or diaphoretic.   HENT:      Head: Normocephalic and atraumatic.   Cardiovascular:      Rate and Rhythm: Normal rate and regular rhythm.      Pulses: Normal pulses.      Heart sounds: Normal heart sounds.   Musculoskeletal:      Right lower leg: Edema present.      Left lower leg: Edema present. "   Skin:     General: Skin is warm and dry.      Coloration: Skin is not jaundiced or pale.   Neurological:      Mental Status: She is alert. Mental status is at baseline.      Cranial Nerves: No cranial nerve deficit.      Sensory: Sensory deficit present.      Motor: Weakness present.      Gait: Gait abnormal.   Psychiatric:         Mood and Affect: Mood normal.         Behavior: Behavior normal.         Thought Content: Thought content normal.         Judgment: Judgment normal.       Assessment:       Problem List Items Addressed This Visit    None  Visit Diagnoses       History of PTCA    -  Primary    Diabetes mellitus without complication        Essential hypertension        Hypercholesterolemia        Senile osteoporosis        TIA (transient ischemic attack)        Frailty syndrome in geriatric patient        Sensorineural hearing loss (SNHL) of both ears                  Plan:       Patient's glycemic control could be better if she could control her diet and be able to achieve a hemoglobin A1c equal to or less than 8.  That was the value for the last 2 test all the way back to September of 2021.  Doses of insulin are not to be changed at this time and plans are for the hemoglobin A1c to be repeated at this facility February 7, 2023 with follow-up appointment on February 9th.  That with the daughter can accompany her.      She has not had any fall.  She has a very doting family.      She can self refer for an eye examination.      To continue testing CBG's twice a day and keep a record and bring to the next appointment.    Lipid panel is not to be addressed at this time.      At the time of the next visit she can have a urine microalbumin but is really academic at this time being that she is 89 years of age and renal function has remained stable.      Blood pressure is well controlled with current therapy.      There have been no TIA like symptomatology and she remains on aspirin and clopidogrel.      There  is no significant change with regards to her bilateral sensorineural hearing loss.      She is tolerating the atorvastatin well.      Patient is not experiencing angina nor orthostasis on the isosorbide mononitrate that she has been on for years    Medications requested will be refilled at this time.    Pt is well aware of the signs and symptoms to watch for and to seek medical attention in case these appear

## 2022-10-10 ENCOUNTER — CLINICAL SUPPORT (OUTPATIENT)
Dept: FAMILY MEDICINE | Facility: CLINIC | Age: 87
End: 2022-10-10
Payer: MEDICARE

## 2022-10-10 DIAGNOSIS — Z23 FLU VACCINE NEED: Primary | ICD-10-CM

## 2022-10-10 PROCEDURE — G0008 ADMIN INFLUENZA VIRUS VAC: HCPCS | Mod: S$GLB,,, | Performed by: INTERNAL MEDICINE

## 2022-10-10 PROCEDURE — G0008 FLU VACCINE - QUADRIVALENT - ADJUVANTED: ICD-10-PCS | Mod: S$GLB,,, | Performed by: INTERNAL MEDICINE

## 2022-10-10 PROCEDURE — 90694 VACC AIIV4 NO PRSRV 0.5ML IM: CPT | Mod: S$GLB,,, | Performed by: INTERNAL MEDICINE

## 2022-10-10 PROCEDURE — 90694 FLU VACCINE - QUADRIVALENT - ADJUVANTED: ICD-10-PCS | Mod: S$GLB,,, | Performed by: INTERNAL MEDICINE

## 2022-10-10 NOTE — PROGRESS NOTES
Patient seen today as a nurse visit for flu vaccine. Injection given IM to left deltoid.  Patient tolerated procedure well. MEGHNA Rick

## 2022-11-22 ENCOUNTER — TELEPHONE (OUTPATIENT)
Dept: FAMILY MEDICINE | Facility: CLINIC | Age: 87
End: 2022-11-22
Payer: MEDICARE

## 2023-02-07 ENCOUNTER — LAB VISIT (OUTPATIENT)
Dept: FAMILY MEDICINE | Facility: CLINIC | Age: 88
End: 2023-02-07
Payer: MEDICARE

## 2023-02-07 DIAGNOSIS — E11.69 ONYCHOMYCOSIS OF MULTIPLE TOENAILS WITH TYPE 2 DIABETES MELLITUS: ICD-10-CM

## 2023-02-07 DIAGNOSIS — E11.9 DIABETES MELLITUS WITHOUT COMPLICATION: ICD-10-CM

## 2023-02-07 DIAGNOSIS — B35.1 ONYCHOMYCOSIS OF MULTIPLE TOENAILS WITH TYPE 2 DIABETES MELLITUS: ICD-10-CM

## 2023-02-07 LAB
ALBUMIN SERPL BCP-MCNC: 3.6 G/DL (ref 3.5–5.2)
ALP SERPL-CCNC: 65 U/L (ref 55–135)
ALT SERPL W/O P-5'-P-CCNC: 13 U/L (ref 10–44)
ANION GAP SERPL CALC-SCNC: 11 MMOL/L (ref 8–16)
AST SERPL-CCNC: 15 U/L (ref 10–40)
BILIRUB SERPL-MCNC: 0.6 MG/DL (ref 0.1–1)
BUN SERPL-MCNC: 34 MG/DL (ref 10–30)
CALCIUM SERPL-MCNC: 8.3 MG/DL (ref 8.7–10.5)
CHLORIDE SERPL-SCNC: 110 MMOL/L (ref 95–110)
CO2 SERPL-SCNC: 18 MMOL/L (ref 23–29)
CREAT SERPL-MCNC: 1.9 MG/DL (ref 0.5–1.4)
EST. GFR  (NO RACE VARIABLE): 24 ML/MIN/1.73 M^2
GLUCOSE SERPL-MCNC: 360 MG/DL (ref 70–110)
POTASSIUM SERPL-SCNC: 5.1 MMOL/L (ref 3.5–5.1)
PROT SERPL-MCNC: 6.1 G/DL (ref 6–8.4)
SODIUM SERPL-SCNC: 139 MMOL/L (ref 136–145)

## 2023-02-07 PROCEDURE — 83036 HEMOGLOBIN GLYCOSYLATED A1C: CPT | Performed by: INTERNAL MEDICINE

## 2023-02-07 PROCEDURE — 80053 COMPREHEN METABOLIC PANEL: CPT | Performed by: INTERNAL MEDICINE

## 2023-02-08 LAB
ESTIMATED AVG GLUCOSE: 203 MG/DL (ref 68–131)
HBA1C MFR BLD: 8.7 % (ref 4–5.6)

## 2023-02-09 ENCOUNTER — OFFICE VISIT (OUTPATIENT)
Dept: FAMILY MEDICINE | Facility: CLINIC | Age: 88
End: 2023-02-09
Payer: MEDICARE

## 2023-02-09 VITALS
HEIGHT: 60 IN | WEIGHT: 171.75 LBS | DIASTOLIC BLOOD PRESSURE: 70 MMHG | BODY MASS INDEX: 33.72 KG/M2 | RESPIRATION RATE: 16 BRPM | SYSTOLIC BLOOD PRESSURE: 124 MMHG | OXYGEN SATURATION: 94 % | HEART RATE: 61 BPM

## 2023-02-09 DIAGNOSIS — E78.00 HYPERCHOLESTEROLEMIA: ICD-10-CM

## 2023-02-09 DIAGNOSIS — E11.69 ONYCHOMYCOSIS OF MULTIPLE TOENAILS WITH TYPE 2 DIABETES MELLITUS: ICD-10-CM

## 2023-02-09 DIAGNOSIS — D64.9 ANEMIA, UNSPECIFIED TYPE: ICD-10-CM

## 2023-02-09 DIAGNOSIS — B35.1 ONYCHOMYCOSIS OF MULTIPLE TOENAILS WITH TYPE 2 DIABETES MELLITUS: ICD-10-CM

## 2023-02-09 DIAGNOSIS — E11.9 DIABETES MELLITUS WITHOUT COMPLICATION: Primary | ICD-10-CM

## 2023-02-09 DIAGNOSIS — N18.4 CRF (CHRONIC RENAL FAILURE), STAGE 4 (SEVERE): Primary | ICD-10-CM

## 2023-02-09 DIAGNOSIS — I10 ESSENTIAL HYPERTENSION: ICD-10-CM

## 2023-02-09 DIAGNOSIS — N18.4 CRF (CHRONIC RENAL FAILURE), STAGE 4 (SEVERE): ICD-10-CM

## 2023-02-09 DIAGNOSIS — R54 FRAILTY SYNDROME IN GERIATRIC PATIENT: ICD-10-CM

## 2023-02-09 PROBLEM — I47.20 VENTRICULAR TACHYCARDIA: Status: RESOLVED | Noted: 2022-02-28 | Resolved: 2023-02-09

## 2023-02-09 LAB
ALBUMIN/CREAT UR: 220 UG/MG (ref 0–30)
CREAT UR-MCNC: 75 MG/DL (ref 15–325)
MICROALBUMIN UR DL<=1MG/L-MCNC: 165 UG/ML

## 2023-02-09 PROCEDURE — 99213 PR OFFICE/OUTPT VISIT, EST, LEVL III, 20-29 MIN: ICD-10-PCS | Mod: S$GLB,,, | Performed by: INTERNAL MEDICINE

## 2023-02-09 PROCEDURE — 82570 ASSAY OF URINE CREATININE: CPT | Performed by: INTERNAL MEDICINE

## 2023-02-09 PROCEDURE — 99213 OFFICE O/P EST LOW 20 MIN: CPT | Mod: S$GLB,,, | Performed by: INTERNAL MEDICINE

## 2023-02-09 NOTE — PROGRESS NOTES
Subjective:       Patient ID: Galilea Fountain is a 89 y.o. female.    Chief Complaint: Follow-up (PT presents to the clinic for a lab f/u)    Patient presents in the company of her daughter who is her primary caregiver for follow-up of her glycemic control based on laboratory evaluation obtained at this facility on February 7, 2023.  Hemoglobin A1c returned at 8.7% which is an increase from before, giving her an average glucose over the last 60 days of 203.  At the time of the lab work when she was nonfasting her glucose was 360.  These values are unacceptable.  Plans are for her insulin therapy with the Tresiba to be changed to 22 units at bedtime instead of 18 but she is to continue the NovoLog at 8 units with lunch and evening meal.  On the rest of the comprehensive metabolic panel sodium was 139 potassium 5.1 chloride 110 bicarb of 18.  BUN was 34 with creatinine 1.9 giving her calculated glomerular filtration rate of only 24 cc.  This is worsened from before.  Calcium was slightly decreased at 8.3 with an albumin of 3.6.  Liver function tests were otherwise unremarkable with AST and ALT equal to or less than 15 total bilirubin of 0.6 alkaline phosphatase of 65.      Blood pressure is controlled 124/70.  Patient is in normal sinus rhythm in the mid 60s.      There is no changes with regards to her sensorium.      Microalbumin urine test was obtained today as part of the routine diabetic follow-up protocol and will be also use to assess her renal function.  Consideration will be given to referral to Nephrology.    I have recommended for them to ascertain hydration and she definitely can not be using any nonsteroidal anti-inflammatory drugs because of the potential nephrotoxic effect.    Review of Systems   All other systems reviewed and are negative.      Objective:      Physical Exam  Vitals and nursing note reviewed. Exam conducted with a chaperone present.   Constitutional:       General: She is not in acute  distress.     Appearance: Normal appearance. She is obese. She is not ill-appearing, toxic-appearing or diaphoretic.   HENT:      Head: Normocephalic and atraumatic.   Cardiovascular:      Rate and Rhythm: Normal rate and regular rhythm.      Pulses: Normal pulses.   Pulmonary:      Effort: Pulmonary effort is normal.   Skin:     General: Skin is warm and dry.      Coloration: Skin is not jaundiced or pale.   Neurological:      General: No focal deficit present.      Mental Status: She is alert and oriented to person, place, and time. Mental status is at baseline.      Cranial Nerves: No cranial nerve deficit.      Sensory: Sensory deficit present.      Motor: Weakness present.      Gait: Gait abnormal.   Psychiatric:         Mood and Affect: Mood normal.         Behavior: Behavior normal.         Thought Content: Thought content normal.         Judgment: Judgment normal.       Assessment:       Problem List Items Addressed This Visit          Derm    Onychomycosis of multiple toenails with type 2 diabetes mellitus     Other Visit Diagnoses       Diabetes mellitus without complication    -  Primary    Relevant Orders    Hemoglobin A1C    Microalbumin/Creatinine Ratio, Urine    CRF (chronic renal failure), stage 4 (severe)        Hypercholesterolemia        Frailty syndrome in geriatric patient        Essential hypertension                  Plan:       Patient's diabetes very poorly controlled and plans are for the Tresiba to be increased from 18 units to 22 units at bedtime.  She is to leave the NovoLog at 8 units pre lunch and evening meal.  She is to continue performing Accu-Cheks in the goal is for her to have fasting glucoses averaging 120 or less and 2 hour postprandial averaging 160 or less.  The daughter is to call me back with the values on February 14.  The Tresiba dose is to be instituted this afternoon with her bedtime dose.  They have enough testing supplies at home.      Blood pressure is well  controlled.      There is a significant detrimental change in her renal function and I have encouraged her to stay well hydrated and avoid any potential nephrotoxic drugs like NSAIDs this over-the-counter.  Repeat BNP is to be performed in 1 month.    Urine for microalbumin was obtained today.    Daughter is to go by Dr. Traylor's office to schedule her for her follow-up appointment which is past due.    He is important to note the patient is 89 years of age and before she has been reluctant to complete a full workup for this can of laboratory abnormalities.

## 2023-03-07 ENCOUNTER — LAB VISIT (OUTPATIENT)
Dept: FAMILY MEDICINE | Facility: CLINIC | Age: 88
End: 2023-03-07
Payer: MEDICARE

## 2023-03-07 DIAGNOSIS — N18.4 CRF (CHRONIC RENAL FAILURE), STAGE 4 (SEVERE): ICD-10-CM

## 2023-03-07 DIAGNOSIS — D64.9 ANEMIA, UNSPECIFIED TYPE: ICD-10-CM

## 2023-03-07 LAB
ANION GAP SERPL CALC-SCNC: 9 MMOL/L (ref 8–16)
BASOPHILS # BLD AUTO: 0.04 K/UL (ref 0–0.2)
BASOPHILS NFR BLD: 0.5 % (ref 0–1.9)
BUN SERPL-MCNC: 30 MG/DL (ref 10–30)
CALCIUM SERPL-MCNC: 8.1 MG/DL (ref 8.7–10.5)
CHLORIDE SERPL-SCNC: 111 MMOL/L (ref 95–110)
CO2 SERPL-SCNC: 20 MMOL/L (ref 23–29)
CREAT SERPL-MCNC: 1.6 MG/DL (ref 0.5–1.4)
DIFFERENTIAL METHOD: ABNORMAL
EOSINOPHIL # BLD AUTO: 0.2 K/UL (ref 0–0.5)
EOSINOPHIL NFR BLD: 2.3 % (ref 0–8)
ERYTHROCYTE [DISTWIDTH] IN BLOOD BY AUTOMATED COUNT: 13.5 % (ref 11.5–14.5)
EST. GFR  (NO RACE VARIABLE): 30 ML/MIN/1.73 M^2
GLUCOSE SERPL-MCNC: 257 MG/DL (ref 70–110)
HCT VFR BLD AUTO: 40 % (ref 37–48.5)
HGB BLD-MCNC: 12.3 G/DL (ref 12–16)
IMM GRANULOCYTES # BLD AUTO: 0.03 K/UL (ref 0–0.04)
IMM GRANULOCYTES NFR BLD AUTO: 0.4 % (ref 0–0.5)
LYMPHOCYTES # BLD AUTO: 1.4 K/UL (ref 1–4.8)
LYMPHOCYTES NFR BLD: 19.1 % (ref 18–48)
MCH RBC QN AUTO: 31.1 PG (ref 27–31)
MCHC RBC AUTO-ENTMCNC: 30.8 G/DL (ref 32–36)
MCV RBC AUTO: 101 FL (ref 82–98)
MONOCYTES # BLD AUTO: 0.6 K/UL (ref 0.3–1)
MONOCYTES NFR BLD: 8.8 % (ref 4–15)
NEUTROPHILS # BLD AUTO: 5 K/UL (ref 1.8–7.7)
NEUTROPHILS NFR BLD: 68.9 % (ref 38–73)
NRBC BLD-RTO: 0 /100 WBC
PLATELET # BLD AUTO: 197 K/UL (ref 150–450)
PMV BLD AUTO: 11.5 FL (ref 9.2–12.9)
POTASSIUM SERPL-SCNC: 4.8 MMOL/L (ref 3.5–5.1)
RBC # BLD AUTO: 3.96 M/UL (ref 4–5.4)
SODIUM SERPL-SCNC: 140 MMOL/L (ref 136–145)
WBC # BLD AUTO: 7.29 K/UL (ref 3.9–12.7)

## 2023-03-07 PROCEDURE — 80048 BASIC METABOLIC PNL TOTAL CA: CPT | Performed by: INTERNAL MEDICINE

## 2023-03-07 PROCEDURE — 85025 COMPLETE CBC W/AUTO DIFF WBC: CPT | Performed by: INTERNAL MEDICINE

## 2023-05-04 RX ORDER — CYANOCOBALAMIN 1000 UG/ML
INJECTION, SOLUTION INTRAMUSCULAR; SUBCUTANEOUS
Qty: 3 ML | Refills: 2 | Status: SHIPPED | OUTPATIENT
Start: 2023-05-04

## 2023-05-23 ENCOUNTER — LAB VISIT (OUTPATIENT)
Dept: FAMILY MEDICINE | Facility: CLINIC | Age: 88
End: 2023-05-23
Payer: MEDICARE

## 2023-05-23 DIAGNOSIS — E11.9 DIABETES MELLITUS WITHOUT COMPLICATION: ICD-10-CM

## 2023-05-23 PROCEDURE — 83036 HEMOGLOBIN GLYCOSYLATED A1C: CPT | Performed by: INTERNAL MEDICINE

## 2023-05-24 LAB
ESTIMATED AVG GLUCOSE: 160 MG/DL (ref 68–131)
HBA1C MFR BLD: 7.2 % (ref 4–5.6)

## 2023-05-25 ENCOUNTER — OFFICE VISIT (OUTPATIENT)
Dept: FAMILY MEDICINE | Facility: CLINIC | Age: 88
End: 2023-05-25
Payer: MEDICARE

## 2023-05-25 VITALS
OXYGEN SATURATION: 97 % | WEIGHT: 174.19 LBS | SYSTOLIC BLOOD PRESSURE: 122 MMHG | HEIGHT: 60 IN | BODY MASS INDEX: 34.2 KG/M2 | RESPIRATION RATE: 18 BRPM | DIASTOLIC BLOOD PRESSURE: 70 MMHG | HEART RATE: 81 BPM

## 2023-05-25 DIAGNOSIS — I10 ESSENTIAL HYPERTENSION: ICD-10-CM

## 2023-05-25 DIAGNOSIS — R54 FRAILTY SYNDROME IN GERIATRIC PATIENT: ICD-10-CM

## 2023-05-25 DIAGNOSIS — E11.9 DIABETES MELLITUS WITHOUT COMPLICATION: Primary | ICD-10-CM

## 2023-05-25 DIAGNOSIS — N18.4 CRF (CHRONIC RENAL FAILURE), STAGE 4 (SEVERE): ICD-10-CM

## 2023-05-25 PROCEDURE — 99213 PR OFFICE/OUTPT VISIT, EST, LEVL III, 20-29 MIN: ICD-10-PCS | Mod: S$GLB,,, | Performed by: INTERNAL MEDICINE

## 2023-05-25 PROCEDURE — 99213 OFFICE O/P EST LOW 20 MIN: CPT | Mod: S$GLB,,, | Performed by: INTERNAL MEDICINE

## 2023-05-25 NOTE — PROGRESS NOTES
Subjective     Patient ID: Galilea Fountain is a 89 y.o. female.    Chief Complaint: Follow-up (Pt presents to the clinic for a lab f/u on results./ )    Patient presents in the company of her daughter who is the primary caregiver for follow-up of her glycemic control.  Hemoglobin A1c drawn on May 23, 2023 returned at 7.2% giving her an average glucose over the last 60 days of only 160.  This is an excellent improvement from the prior of 8.9.  There has been no hypoglycemia like symptomatology.  Treatment is to continue as is.  Patient has enough testing supplies at home and being that she is on insulin she test 2 times a day.    Review of system is otherwise unremarkable.      No care gaps were addressed at this time.      Blood pressure is stable at 120 2/70.  She is in normal sinus rhythm in the mid 80s.      There has been a slight increase in weight over the last year probably related to the she has improved glycemic control.  I have explained to her again the fact that when her glucoses better control that is more difficulty being able to lose weight because she has better glucose utilization.    There is no changes as it pertains to cardiorespiratory of central nervous system.      She was very jovial today and very satisfied with this findings.          Review of Systems   All other systems reviewed and are negative.       Objective     Physical Exam  Vitals and nursing note reviewed. Exam conducted with a chaperone present.   Constitutional:       General: She is not in acute distress.     Appearance: Normal appearance. She is obese. She is not ill-appearing, toxic-appearing or diaphoretic.   HENT:      Head: Normocephalic and atraumatic.   Cardiovascular:      Rate and Rhythm: Normal rate.      Pulses: Normal pulses.   Pulmonary:      Effort: Pulmonary effort is normal.   Skin:     General: Skin is warm.      Coloration: Skin is not jaundiced or pale.   Neurological:      Mental Status: She is alert. Mental  status is at baseline.      Cranial Nerves: No cranial nerve deficit.      Sensory: Sensory deficit present.      Motor: Weakness present.      Coordination: Coordination normal.      Gait: Gait abnormal.   Psychiatric:         Mood and Affect: Mood normal.         Behavior: Behavior normal.         Thought Content: Thought content normal.         Judgment: Judgment normal.          Assessment and Plan     Problem List Items Addressed This Visit    None  Visit Diagnoses       Diabetes mellitus without complication    -  Primary    CRF (chronic renal failure), stage 4 (severe)        Essential hypertension        Frailty syndrome in geriatric patient                Patient's glycemic control is markedly improved down to 7.2 from an 8.7 before.  This is excellent for a 89-year-old patient.  There is no signs of hypoglycemia.  She is to in capillary blood glucoses 2 times a day being that she is on insulin therapy.      Medication list has been reviewed and there is no need for any refills at this there is no chest pain palpitation shortness of breath or any other signs of potential cardiac decompensation.      Patient remained stable central nervous system wise.      No care gaps were addressed at this time.      She is to self refer for an eye examination.      Plans are for her to return to the clinic on a nonfasting state on October 17, 2023 for follow-up hemoglobin A1c with a follow-up appointment.    Otherwise patient has an excellent support system at home they are very well aware of the signs and symptoms to watch for and to seek medical attention in case these appear

## 2023-05-29 ENCOUNTER — TELEPHONE (OUTPATIENT)
Dept: FAMILY MEDICINE | Facility: CLINIC | Age: 88
End: 2023-05-29
Payer: MEDICARE

## 2023-05-29 NOTE — TELEPHONE ENCOUNTER
Per Dr Li the patient's home number was called and I spoke to her daughter and explained that Mrs Fountain no longer needs to the the Fosamax (alendronate). Dr Li denied the current refill. Daughter verbalized understanding and will explain to Mrs Fountain.

## 2023-07-12 DIAGNOSIS — E11.9 DIABETES MELLITUS WITHOUT COMPLICATION: ICD-10-CM

## 2023-07-13 RX ORDER — INSULIN DEGLUDEC 200 U/ML
24 INJECTION, SOLUTION SUBCUTANEOUS NIGHTLY
Qty: 3 PEN | Refills: 5 | Status: SHIPPED | OUTPATIENT
Start: 2023-07-13